# Patient Record
Sex: FEMALE | Race: BLACK OR AFRICAN AMERICAN | NOT HISPANIC OR LATINO
[De-identification: names, ages, dates, MRNs, and addresses within clinical notes are randomized per-mention and may not be internally consistent; named-entity substitution may affect disease eponyms.]

---

## 2018-01-30 ENCOUNTER — APPOINTMENT (OUTPATIENT)
Dept: RHEUMATOLOGY | Facility: CLINIC | Age: 58
End: 2018-01-30
Payer: COMMERCIAL

## 2018-01-30 VITALS
WEIGHT: 219 LBS | DIASTOLIC BLOOD PRESSURE: 79 MMHG | SYSTOLIC BLOOD PRESSURE: 135 MMHG | OXYGEN SATURATION: 97 % | HEIGHT: 64 IN | TEMPERATURE: 97.7 F | BODY MASS INDEX: 37.39 KG/M2 | HEART RATE: 81 BPM

## 2018-01-30 DIAGNOSIS — Z98.890 OTHER SPECIFIED POSTPROCEDURAL STATES: ICD-10-CM

## 2018-01-30 DIAGNOSIS — M25.562 PAIN IN LEFT KNEE: ICD-10-CM

## 2018-01-30 DIAGNOSIS — Z78.9 OTHER SPECIFIED HEALTH STATUS: ICD-10-CM

## 2018-01-30 DIAGNOSIS — Z90.710 OTHER SPECIFIED POSTPROCEDURAL STATES: ICD-10-CM

## 2018-01-30 PROBLEM — Z00.00 ENCOUNTER FOR PREVENTIVE HEALTH EXAMINATION: Status: ACTIVE | Noted: 2018-01-30

## 2018-01-30 PROCEDURE — 76882 US LMTD JT/FCL EVL NVASC XTR: CPT | Mod: LT

## 2018-01-30 PROCEDURE — 99204 OFFICE O/P NEW MOD 45 MIN: CPT | Mod: 25

## 2018-01-30 RX ORDER — MELOXICAM 15 MG/1
15 TABLET ORAL DAILY
Qty: 30 | Refills: 1 | Status: ACTIVE | COMMUNITY
Start: 2018-01-30 | End: 1900-01-01

## 2018-02-09 ENCOUNTER — FORM ENCOUNTER (OUTPATIENT)
Age: 58
End: 2018-02-09

## 2018-02-10 ENCOUNTER — OUTPATIENT (OUTPATIENT)
Dept: OUTPATIENT SERVICES | Facility: HOSPITAL | Age: 58
LOS: 1 days | End: 2018-02-10
Payer: COMMERCIAL

## 2018-02-10 ENCOUNTER — APPOINTMENT (OUTPATIENT)
Dept: MRI IMAGING | Facility: IMAGING CENTER | Age: 58
End: 2018-02-10
Payer: COMMERCIAL

## 2018-02-10 DIAGNOSIS — M25.562 PAIN IN LEFT KNEE: ICD-10-CM

## 2018-02-10 PROCEDURE — 73721 MRI JNT OF LWR EXTRE W/O DYE: CPT | Mod: 26,LT

## 2018-02-10 PROCEDURE — 73721 MRI JNT OF LWR EXTRE W/O DYE: CPT

## 2019-01-22 ENCOUNTER — APPOINTMENT (OUTPATIENT)
Dept: UROLOGY | Facility: CLINIC | Age: 59
End: 2019-01-22
Payer: COMMERCIAL

## 2019-01-22 VITALS
BODY MASS INDEX: 39.61 KG/M2 | WEIGHT: 232 LBS | SYSTOLIC BLOOD PRESSURE: 137 MMHG | HEIGHT: 64 IN | RESPIRATION RATE: 18 BRPM | DIASTOLIC BLOOD PRESSURE: 87 MMHG | HEART RATE: 80 BPM | TEMPERATURE: 98.2 F

## 2019-01-22 PROCEDURE — 99203 OFFICE O/P NEW LOW 30 MIN: CPT

## 2019-01-22 NOTE — REVIEW OF SYSTEMS
[Negative] : Heme/Lymph [Genital yeast infection] : genital yeast infection [Presently in menopause ___] : presently in menopause [unfilled] [Told you have blood in urine on a urine test] : told blood was present in a urine test [Strong urge to urinate] : strong urge to urinate [Bladder fullness after urinating] : bladder fullness after urinating [Dizziness] : dizziness [Hot Flashes] : hot flashes

## 2019-01-22 NOTE — LETTER BODY
[FreeTextEntry1] : Ana Maria Swain DO Gerald\par [address] [facility]\[place] [address]\par \par Dear Dr. Duarte,\par \par I had the pleasure of meeting Chayo Guevara in the office today. She is a 58-year-old woman who is referred for evaluation of reported asymptomatic microscopic hematuria. The patient brought some recent laboratory evaluation with her today which showed a dipstick urinalysis with a report of moderate blood. She has never seen any gross hematuria. At the time of urinalysis, she denies any symptoms and also denies any symptoms at this time such as urinary urgency, frequency, dysuria, or urinary incontinence. She denies any history of urinary tract infections or kidney stones. There has been no recent catheterization of the bladder. She has no family or personal history of genitourinary malignancy. Her appetite and her weight are stable. She denies constipation. There has been no unintentional weight loss.\par \par We discussed microscopic hematuria today and the fact that dipstick urine may falsely show blood there may not truly be microscopic hematuria present. I would suggest that we first obtain a true microscopic urinalysis before making any decisions on further evaluation.\par \par The wide range of both benign and malignant conditions associated with hematuria was discussed with the patient in depth. I've explained that malignancy may be a consideration although I would consider a relatively low risk as a possible finding of evaluation in the setting of no other symptoms. I did explain that if microscopic hematuria is present, I will do recommend that she undergo renal sonogram for evaluation of her kidneys for any stone disease or tumor or obstruction. We also discussed cystoscopy is an important evaluation for the bladder for any sign of bladder polyp or tumor.\par \par She communicates her understanding of this plan and is in agreement. I will be in touch with her over the results of today's urinalysis by phone once available for review.\par \par Thank you very much for the kind referral.\par \par Sincerely,\par \par \par \par \par Scott Winter MD, FACS\par  of Urology\par [facility]\par \par [facility]\par Director of Robotics and Minimally Invasive Surgery\par [address] [place] [address]\[place] [address]\par P: [phone]\par F: [phone]\par www.[facility]\par

## 2019-01-25 LAB
APPEARANCE: CLEAR
BACTERIA UR CULT: NORMAL
BACTERIA: ABNORMAL
BILIRUBIN URINE: NEGATIVE
BLOOD URINE: ABNORMAL
COLOR: YELLOW
GLUCOSE QUALITATIVE U: NEGATIVE MG/DL
HYALINE CASTS: 1 /LPF
KETONES URINE: NEGATIVE
LEUKOCYTE ESTERASE URINE: ABNORMAL
MICROSCOPIC-UA: NORMAL
NITRITE URINE: NEGATIVE
PH URINE: 6.5
PROTEIN URINE: NEGATIVE MG/DL
RED BLOOD CELLS URINE: 5 /HPF
SPECIFIC GRAVITY URINE: 1.01
SQUAMOUS EPITHELIAL CELLS: 7 /HPF
UROBILINOGEN URINE: NEGATIVE MG/DL
WHITE BLOOD CELLS URINE: 45 /HPF

## 2019-02-05 ENCOUNTER — APPOINTMENT (OUTPATIENT)
Dept: UROLOGY | Facility: CLINIC | Age: 59
End: 2019-02-05
Payer: COMMERCIAL

## 2019-02-05 ENCOUNTER — OUTPATIENT (OUTPATIENT)
Dept: OUTPATIENT SERVICES | Facility: HOSPITAL | Age: 59
LOS: 1 days | End: 2019-02-05
Payer: COMMERCIAL

## 2019-02-05 VITALS
SYSTOLIC BLOOD PRESSURE: 164 MMHG | RESPIRATION RATE: 17 BRPM | DIASTOLIC BLOOD PRESSURE: 84 MMHG | TEMPERATURE: 98.2 F | HEART RATE: 80 BPM

## 2019-02-05 DIAGNOSIS — Z78.9 OTHER SPECIFIED HEALTH STATUS: ICD-10-CM

## 2019-02-05 DIAGNOSIS — Z82.49 FAMILY HISTORY OF ISCHEMIC HEART DISEASE AND OTHER DISEASES OF THE CIRCULATORY SYSTEM: ICD-10-CM

## 2019-02-05 DIAGNOSIS — R31.21 ASYMPTOMATIC MICROSCOPIC HEMATURIA: ICD-10-CM

## 2019-02-05 DIAGNOSIS — R35.0 FREQUENCY OF MICTURITION: ICD-10-CM

## 2019-02-05 DIAGNOSIS — Z87.891 PERSONAL HISTORY OF NICOTINE DEPENDENCE: ICD-10-CM

## 2019-02-05 DIAGNOSIS — Z83.3 FAMILY HISTORY OF DIABETES MELLITUS: ICD-10-CM

## 2019-02-05 PROCEDURE — 99213 OFFICE O/P EST LOW 20 MIN: CPT | Mod: 25

## 2019-02-05 PROCEDURE — 76775 US EXAM ABDO BACK WALL LIM: CPT | Mod: 26

## 2019-02-05 PROCEDURE — 52000 CYSTOURETHROSCOPY: CPT

## 2019-02-05 PROCEDURE — 76775 US EXAM ABDO BACK WALL LIM: CPT

## 2019-02-06 PROBLEM — Z82.49 FAMILY HISTORY OF ESSENTIAL HYPERTENSION: Status: ACTIVE | Noted: 2019-01-22

## 2019-02-06 PROBLEM — Z83.3 FAMILY HISTORY OF DIABETES MELLITUS: Status: ACTIVE | Noted: 2019-01-22

## 2019-02-06 PROBLEM — Z78.9 SOCIAL ALCOHOL USE: Status: ACTIVE | Noted: 2019-01-22

## 2019-02-06 PROBLEM — Z87.891 FORMER SMOKER: Status: ACTIVE | Noted: 2019-01-22

## 2019-02-06 PROBLEM — R31.21 ASYMPTOMATIC MICROSCOPIC HEMATURIA: Status: ACTIVE | Noted: 2019-01-22

## 2019-02-07 DIAGNOSIS — R31.21 ASYMPTOMATIC MICROSCOPIC HEMATURIA: ICD-10-CM

## 2024-03-22 ENCOUNTER — APPOINTMENT (OUTPATIENT)
Dept: GASTROENTEROLOGY | Facility: CLINIC | Age: 64
End: 2024-03-22
Payer: COMMERCIAL

## 2024-03-22 VITALS
BODY MASS INDEX: 37.05 KG/M2 | WEIGHT: 217 LBS | TEMPERATURE: 97.8 F | SYSTOLIC BLOOD PRESSURE: 138 MMHG | RESPIRATION RATE: 16 BRPM | DIASTOLIC BLOOD PRESSURE: 74 MMHG | HEART RATE: 91 BPM | OXYGEN SATURATION: 95 % | HEIGHT: 64 IN

## 2024-03-22 DIAGNOSIS — E11.9 TYPE 2 DIABETES MELLITUS W/OUT COMPLICATIONS: ICD-10-CM

## 2024-03-22 DIAGNOSIS — Z12.11 ENCOUNTER FOR SCREENING FOR MALIGNANT NEOPLASM OF COLON: ICD-10-CM

## 2024-03-22 DIAGNOSIS — Z86.39 PERSONAL HISTORY OF OTHER ENDOCRINE, NUTRITIONAL AND METABOLIC DISEASE: ICD-10-CM

## 2024-03-22 DIAGNOSIS — Z86.010 PERSONAL HISTORY OF COLONIC POLYPS: ICD-10-CM

## 2024-03-22 PROCEDURE — 99204 OFFICE O/P NEW MOD 45 MIN: CPT

## 2024-03-22 RX ORDER — SODIUM SULFATE, POTASSIUM SULFATE AND MAGNESIUM SULFATE 1.6; 3.13; 17.5 G/177ML; G/177ML; G/177ML
17.5-3.13-1.6 SOLUTION ORAL TWICE DAILY
Qty: 2 | Refills: 0 | Status: ACTIVE | COMMUNITY
Start: 2024-03-22 | End: 1900-01-01

## 2024-03-22 RX ORDER — SEMAGLUTIDE 1.34 MG/ML
INJECTION, SOLUTION SUBCUTANEOUS
Refills: 0 | Status: ACTIVE | COMMUNITY

## 2024-03-22 NOTE — ASSESSMENT
[FreeTextEntry1] : BLOSSOM PEREZ was advised to undergo colonoscopy to which she agreed. The procedure will be performed in [facility] with the assistance of an anesthesiologist. The patient was given a Suprep preparation prescription and understood the  procedure as it was explained to her. She was given a booklet distributed by the [employer]  [employer] explaining the procedure in detail and she understood the risks of the procedure not limited to infection, bleeding, perforation or non- diagnosis of colorectal cancer. She was advised that she could not drive home, if she chooses to  receive sedation.  Further diagnostic and treatment recommendations will be based upon the procedure and any biopsies, if they are taken.  Thank you for allowing me to participate in this [facility] health care.  , Best personal regards -- Don  She needs to stop Ozempic 1 week before    spent 46 minutes with the patient and answered alll of her questions

## 2024-03-22 NOTE — HISTORY OF PRESENT ILLNESS
[FreeTextEntry1] : She is a 63-year-old asymptomatic female referred for a screening colonoscopy.  She has a past history of colon polyps.  Her last colonoscopy was 5 years ago in which 1 polyp was removed.  She denies a family history of colon cancer.  She denies constipation diarrhea rectal bleeding abdominal pain or weight loss.

## 2024-03-22 NOTE — CONSULT LETTER
[Dear  ___] : Dear  [unfilled], [Consult Letter:] : I had the pleasure of evaluating your patient, [unfilled]. [( Thank you for referring [unfilled] for consultation for _____ )] : Thank you for referring [unfilled] for consultation for [unfilled] [Please see my note below.] : Please see my note below. [Consult Closing:] : Thank you very much for allowing me to participate in the care of this patient.  If you have any questions, please do not hesitate to contact me. [Sincerely,] : Sincerely, [FreeTextEntry3] : Rodo Chun MD  Gastroenterology [facility]

## 2024-06-04 ENCOUNTER — APPOINTMENT (OUTPATIENT)
Dept: GASTROENTEROLOGY | Facility: AMBULATORY SURGERY CENTER | Age: 64
End: 2024-06-04
Payer: COMMERCIAL

## 2024-06-04 PROCEDURE — 45378 DIAGNOSTIC COLONOSCOPY: CPT

## 2024-07-09 ENCOUNTER — APPOINTMENT (OUTPATIENT)
Dept: GASTROENTEROLOGY | Facility: CLINIC | Age: 64
End: 2024-07-09
Payer: COMMERCIAL

## 2024-07-09 VITALS
RESPIRATION RATE: 14 BRPM | SYSTOLIC BLOOD PRESSURE: 146 MMHG | BODY MASS INDEX: 38.07 KG/M2 | TEMPERATURE: 98 F | HEIGHT: 64 IN | DIASTOLIC BLOOD PRESSURE: 70 MMHG | OXYGEN SATURATION: 99 % | HEART RATE: 90 BPM | WEIGHT: 223 LBS

## 2024-07-09 DIAGNOSIS — Z12.11 ENCOUNTER FOR SCREENING FOR MALIGNANT NEOPLASM OF COLON: ICD-10-CM

## 2024-07-09 PROCEDURE — 99212 OFFICE O/P EST SF 10 MIN: CPT
